# Patient Record
Sex: FEMALE | Race: BLACK OR AFRICAN AMERICAN | NOT HISPANIC OR LATINO | URBAN - NONMETROPOLITAN AREA
[De-identification: names, ages, dates, MRNs, and addresses within clinical notes are randomized per-mention and may not be internally consistent; named-entity substitution may affect disease eponyms.]

---

## 2019-03-19 ENCOUNTER — IMPORTED ENCOUNTER (OUTPATIENT)
Dept: URBAN - NONMETROPOLITAN AREA CLINIC 1 | Facility: CLINIC | Age: 74
End: 2019-03-19

## 2019-03-19 PROCEDURE — 99213 OFFICE O/P EST LOW 20 MIN: CPT

## 2019-03-19 PROCEDURE — 92083 EXTENDED VISUAL FIELD XM: CPT

## 2019-03-19 NOTE — PATIENT DISCUSSION
Amaurosis fugax-Dx discussed in detail w/pt. -Pt has ciliary artery OD.-Pt referred for carotid duplex. [facility]SB recommends getting US of carotids US of heart. [facility]SB listened to carotids in office and heard no bruits or murmurs. Refer to Dr. Jarret Oliver for heart consult d/t SOB tachycardia and chest pressure prior to loss of vision episode possible echo. -Order Carotid Duplex -Order VF d/t loss of vision OD X20 min performed and reviewed w/pt. -Possible TIA no residual effects.

## 2019-03-20 PROBLEM — H43.811: Noted: 2019-03-20

## 2019-03-20 PROBLEM — G45.3: Noted: 2019-03-20

## 2019-03-20 PROBLEM — H26.492: Noted: 2019-03-20

## 2019-03-20 PROBLEM — H16.223: Noted: 2019-03-20

## 2019-03-20 PROBLEM — H43.813: Noted: 2019-03-20

## 2019-03-20 PROBLEM — Z96.1: Noted: 2019-03-20

## 2019-03-20 PROBLEM — H04.123: Noted: 2019-03-20

## 2019-03-20 PROBLEM — Z98.41: Noted: 2019-03-20

## 2019-08-19 NOTE — PATIENT DISCUSSION
Redesign RGP OD. Difficulty with removal, decrease size and change peripheral curve. If unsuccessful with redesign go with felicitas cone fitting set.

## 2020-01-03 NOTE — PATIENT DISCUSSION
Over the counter ointment qhs OU, Warm Compresses BID OU, Eyelid Scrubs, Celluvisc 5x a day, Klarity C BID OU.

## 2020-01-03 NOTE — PATIENT DISCUSSION
patient's ulcer is resolved  - no signs of ulcer.  however, PEE in inferior 1/3 of cornea.  Patient has significant dry eye.  will lubricate.

## 2020-07-27 NOTE — PATIENT DISCUSSION
Dispensed lenses today. Rec +3.00 readers, may need more OR, Using Yolis and Purilens and Clear Care. Continue with this. Same materials as her RGP's, so keep same regimine for compliance ease. Cory Caba

## 2020-08-13 NOTE — PATIENT DISCUSSION
Dispensed lenses today. Rec +3.00 readers, may need more OR, Using Yolis and Purilens and Clear Care. Continue with this. Same materials as her RGP's, so keep same regimine for compliance ease. Queen Ronal

## 2020-09-03 NOTE — PATIENT DISCUSSION
New OD is fluctuating. Repeat wear with Black dot at bottom. Compare VA to last lens. Inconsistent VA OD. New OS is good. Keep OS same.

## 2020-09-03 NOTE — PATIENT DISCUSSION
Dispensed lenses today. Rec +3.00 readers, may need more OR, Using Yolis and Purilens and Clear Care. Continue with this. Same materials as her RGP's, so keep same regimine for compliance ease. Tomy Kumar

## 2020-09-17 NOTE — PATIENT DISCUSSION
Discussed with pt that the OR today reflects going back to the 2nd lens, but she had already found that was not as good, so we are staying with this lens. 2nd OS lens is good. Continue with these. RX written. Pt to retrun to Dr Tavares Gutiérrez further care. He may be able to update any rx changes in future with parameters given above. RTC to TDW PRN.

## 2022-04-09 ASSESSMENT — VISUAL ACUITY
OD_SC: 20/20
OS_SC: 20/20

## 2022-04-09 ASSESSMENT — TONOMETRY
OD_IOP_MMHG: 15
OS_IOP_MMHG: 17

## 2022-08-19 NOTE — PATIENT DISCUSSION
Increase PFAT's q 1-2 hours x 12- days to relieve the eye. Advised pt that she should replace the lenses. Likely deposits are causing the irritation.

## 2022-08-19 NOTE — PATIENT DISCUSSION
Lens fit and vision is stable. The OD is as predicted when it is in place, but for some reason it is rotating. Give it two more weeks. Pt aware to center don't at 6. If continues, then will reorder the OD and possibly remove coating.

## 2023-01-23 NOTE — PATIENT DISCUSSION
AT NP OU QID.
Advised pt that there is no lens present or any abrasion. PFAT's q1-2 hours x1-2 days.
Amsler grid at home. MVI/AREDS discussed. Patient to call if any changes in vision or grid card.
Bepreve OU QD-BID; change to Pazeo due to insurance plan.
Chilled PF artificial tears prn.
Conitnue her regimine. Discussed lid hygiene, warm compress and eyelid wash.
Continue PAzeo.
Continue her regimine.
Cool compresses.
Discontinue enzymatic protein remover to daily surfactant. Sensitivity. Research NP RGP solutions.
Discussed condition and exacerbating conditions/situations (e.g., dry/arid environments, overhead fans, air conditioners, side effect of medications).
Discussed with pt that the OR today reflects going back to the 2nd lens, but she had already found that was not as good, so we are staying with this lens. 2nd OS lens is good. Continue with these. RX written. Pt to retrun to Dr Tarik Ribeiro further care. He may be able to update any rx changes in future with parameters given above. RTC to TDW PRN.
Dr Donna Vasquez will assess for potential CXL.
Final Scleral lenses today.
Increase PFAT's q 1-2 hours x 12- days to relieve the eye. Advised pt that she should replace the lenses. Likely deposits are causing the irritation.
Monitor.
OD lens $600/ OS $500.
Order new set of lenses and F/U for exam and refit.
Over the counter ointment qhs OU, Warm Compresses BID OU, Eyelid Scrubs, Celluvisc 5x a day, Klarity C BID OU.
Patient understands condition, prognosis and need for follow up care.
Recommended artificial tears to use: 1 drop 4x a day in both eyes.
Recommended lid scrubs.
Refer for consultation.
Scheduled to see Dr Meagan Hightower.
Stable.
Unstable OD, revised RGP Rx, 1/28/20.
Yolis as , Clear care dinsfection, Purilens, Systane balance.
patient's ulcer is resolved  - no signs of ulcer.  however, PEE in inferior 1/3 of cornea.  Patient has significant dry eye.  will lubricate.
Warm

## 2024-01-17 NOTE — PATIENT DISCUSSION
[facility] - CARDIOLOGY PROGRESS NOTE      Margaret Silverio Patient Status:  Inpatient    3/14/1944 MRN [id number]   Location [facility] 3W/SW Attending Rashaad Nicholson MD   Hosp Day # 4 PCP Johnathon Rodriguez,          Assessment and Plan:     GI bleeding  Anticoagulation held with GI bleeding.  Patient with history of PAF presently AV paced.  Being evaluated for Watchman.  CT a did not clearly demonstrate the appendage.  Will try to get old records from [place] where she had mitral valve surgery in  to be sure that she did not have a closure device.  If no closure device can further assess watchman     ICD dual-chamber  Monitor as outpatient    Heart failure with reduced ejection fraction  EF was 33%  Volume status improved.  Switch to oral diuretic in a.m.   Subjective:   Margaret Silverio is a(n) 79 year old female with no new c/o today    Objective:   Blood pressure 155/88, pulse 60, temperature 98.3 °F (36.8 °C), temperature source Oral, resp. rate 14, height 5' 5\" (1.651 m), weight 146 lb (66.2 kg), SpO2 96%.  Intake/Output:        Last 24 hours:   Intake/Output Summary (Last 24 hours) at 2024 1609  Last data filed at 2024 1436  Gross per 24 hour   Intake 1792 ml   Output 700 ml   Net 1092 ml      This shift: I/O this shift:  In: 600 [I.V.:600]  Out: 400 [Urine:400]    Exam  Gen: Comfortable, in no acute distress,    Psych.alert and oriented x3  HEENT: atraumatic, normal conjunctiva, moist mucosa  Neck:supple,no JVD, no bruits  Lungs: clear to ascultation, normal respiratory effort  Cardiac: regular rate and rhythm, nl S1,S2, no pathologic murmur  Abd: Abdomen soft, nontender, nondistended,  bowel sounds present  Ext:  no clubbing, no cyanosis,no edema  Neuro: no focal deficits  Skin: no rashes or lesions        Scheduled Meds:    potassium chloride  40 mEq Intravenous Once    furosemide  20 mg Intravenous BID (Diuretic)     Refer for scleral Lens fitting with TDW. amitriptyline  25 mg Oral Nightly    carvedilol  25 mg Oral BID    sacubitril-valsartan  1 tablet Oral BID    piperacillin-tazobactam  3.375 g Intravenous Q8H    pantoprazole  40 mg Intravenous Q12H       Results:     Lab Results   Component Value Date    WBC 2.8 (L) 01/17/2024    HGB 8.6 (L) 01/17/2024    HCT 27.6 (L) 01/17/2024    PLT 90.0 (L) 01/17/2024    CREATSERUM 1.27 (H) 01/17/2024    BUN 12 01/17/2024     01/17/2024    K 3.0 (L) 01/17/2024     01/17/2024    CO2 30.0 01/17/2024    GLU 92 01/17/2024    CA 9.2 01/17/2024    ALB 3.4 01/17/2024    ALKPHO 110 01/13/2024    BILT 1.6 (H) 01/13/2024    TP 5.6 (L) 01/13/2024    AST 38 (H) 01/13/2024    ALT 15 01/13/2024    PTT 42.1 (H) 01/15/2024    INR 1.60 (H) 01/15/2024    T4F 1.2 01/12/2024    TSH 19.180 (H) 01/12/2024    LIP 32 01/13/2024    MG 1.8 01/17/2024    PHOS 2.5 01/17/2024    B12 >2,000 (H) 01/14/2024       CT CARDIAC OVER READ    Result Date: 1/17/2024  CONCLUSION:   Cardiac over-read performed. Please see the separately dictated cardiologist report for further details.  Small to moderate loculated right pleural effusion stable since the prior study with mild probable partial right lower lobe adjacent passive atelectasis.  Trace left pleural effusion also unchanged since the prior study.    Dictated by (CST): Azar Mendez MD on 1/17/2024 at 12:49 PM     Finalized by (CST): Azar Mendez MD on 1/17/2024 at 12:53 PM          XR CHEST AP PORTABLE  (CPT=71045)    Result Date: 1/16/2024  CONCLUSION:  1. Redemonstration of mild cardiomegaly and mild pulmonary vascular congestion with interstitial edema suggesting cardiac failure/fluid overload.  Worsening moderate opacification of the right lung base suggesting worsening right pleural effusion which may in part be loculated, and I can not exclude right basal pneumonia and or atelectasis.  Follow-up studies are advised.  Mild blunting of left costophrenic angle.    Dictated by (CST):  Josue Hogan MD on 1/16/2024 at 9:01 AM     Finalized by (CST): Josue Hogan MD on 1/16/2024 at 9:03 AM                 Ayush Whitten MD  [facility]  1/17/2024

## 2025-03-12 ENCOUNTER — NEW PATIENT (OUTPATIENT)
Age: 80
End: 2025-03-12

## 2025-03-12 DIAGNOSIS — H43.813: ICD-10-CM

## 2025-03-12 DIAGNOSIS — H52.4: ICD-10-CM

## 2025-03-12 DIAGNOSIS — H26.492: ICD-10-CM

## 2025-03-12 DIAGNOSIS — H35.362: ICD-10-CM

## 2025-03-12 DIAGNOSIS — H52.223: ICD-10-CM

## 2025-03-12 DIAGNOSIS — H16.223: ICD-10-CM

## 2025-03-12 DIAGNOSIS — Z96.1: ICD-10-CM

## 2025-03-12 DIAGNOSIS — H52.13: ICD-10-CM

## 2025-03-12 PROCEDURE — 92134 CPTRZ OPH DX IMG PST SGM RTA: CPT

## 2025-03-12 PROCEDURE — 92015 DETERMINE REFRACTIVE STATE: CPT

## 2025-03-12 PROCEDURE — 92004 COMPRE OPH EXAM NEW PT 1/>: CPT
